# Patient Record
Sex: FEMALE | Race: WHITE
[De-identification: names, ages, dates, MRNs, and addresses within clinical notes are randomized per-mention and may not be internally consistent; named-entity substitution may affect disease eponyms.]

---

## 2017-11-06 ENCOUNTER — HOSPITAL ENCOUNTER (INPATIENT)
Dept: HOSPITAL 17 - PHED | Age: 62
LOS: 4 days | Discharge: HOME | DRG: 871 | End: 2017-11-10
Attending: HOSPITALIST | Admitting: HOSPITALIST
Payer: COMMERCIAL

## 2017-11-06 VITALS — HEIGHT: 57 IN | WEIGHT: 138.01 LBS | BODY MASS INDEX: 29.77 KG/M2

## 2017-11-06 VITALS
TEMPERATURE: 99.6 F | OXYGEN SATURATION: 98 % | RESPIRATION RATE: 18 BRPM | HEART RATE: 83 BPM | DIASTOLIC BLOOD PRESSURE: 54 MMHG | SYSTOLIC BLOOD PRESSURE: 96 MMHG

## 2017-11-06 VITALS — HEART RATE: 82 BPM

## 2017-11-06 VITALS
DIASTOLIC BLOOD PRESSURE: 64 MMHG | OXYGEN SATURATION: 97 % | RESPIRATION RATE: 18 BRPM | HEART RATE: 89 BPM | SYSTOLIC BLOOD PRESSURE: 125 MMHG | TEMPERATURE: 101.1 F

## 2017-11-06 VITALS
HEART RATE: 113 BPM | OXYGEN SATURATION: 95 % | TEMPERATURE: 102.1 F | DIASTOLIC BLOOD PRESSURE: 78 MMHG | SYSTOLIC BLOOD PRESSURE: 150 MMHG | RESPIRATION RATE: 24 BRPM

## 2017-11-06 VITALS
OXYGEN SATURATION: 94 % | RESPIRATION RATE: 20 BRPM | HEART RATE: 97 BPM | DIASTOLIC BLOOD PRESSURE: 52 MMHG | SYSTOLIC BLOOD PRESSURE: 99 MMHG | TEMPERATURE: 99 F

## 2017-11-06 VITALS
SYSTOLIC BLOOD PRESSURE: 99 MMHG | HEART RATE: 81 BPM | OXYGEN SATURATION: 95 % | TEMPERATURE: 98.5 F | DIASTOLIC BLOOD PRESSURE: 66 MMHG | RESPIRATION RATE: 18 BRPM

## 2017-11-06 VITALS — RESPIRATION RATE: 20 BRPM | OXYGEN SATURATION: 97 %

## 2017-11-06 VITALS — OXYGEN SATURATION: 94 %

## 2017-11-06 VITALS
HEART RATE: 92 BPM | RESPIRATION RATE: 16 BRPM | SYSTOLIC BLOOD PRESSURE: 98 MMHG | DIASTOLIC BLOOD PRESSURE: 53 MMHG | OXYGEN SATURATION: 96 %

## 2017-11-06 VITALS
SYSTOLIC BLOOD PRESSURE: 101 MMHG | HEART RATE: 82 BPM | TEMPERATURE: 97.9 F | DIASTOLIC BLOOD PRESSURE: 56 MMHG | RESPIRATION RATE: 16 BRPM | OXYGEN SATURATION: 93 %

## 2017-11-06 VITALS — HEART RATE: 81 BPM

## 2017-11-06 VITALS
SYSTOLIC BLOOD PRESSURE: 111 MMHG | DIASTOLIC BLOOD PRESSURE: 69 MMHG | RESPIRATION RATE: 20 BRPM | OXYGEN SATURATION: 97 % | HEART RATE: 113 BPM

## 2017-11-06 VITALS
SYSTOLIC BLOOD PRESSURE: 101 MMHG | OXYGEN SATURATION: 97 % | RESPIRATION RATE: 20 BRPM | HEART RATE: 94 BPM | DIASTOLIC BLOOD PRESSURE: 52 MMHG

## 2017-11-06 VITALS
RESPIRATION RATE: 18 BRPM | HEART RATE: 102 BPM | DIASTOLIC BLOOD PRESSURE: 68 MMHG | TEMPERATURE: 99.6 F | OXYGEN SATURATION: 96 % | SYSTOLIC BLOOD PRESSURE: 95 MMHG

## 2017-11-06 VITALS
DIASTOLIC BLOOD PRESSURE: 60 MMHG | RESPIRATION RATE: 18 BRPM | HEART RATE: 82 BPM | TEMPERATURE: 96.5 F | OXYGEN SATURATION: 95 % | SYSTOLIC BLOOD PRESSURE: 112 MMHG

## 2017-11-06 VITALS
DIASTOLIC BLOOD PRESSURE: 72 MMHG | RESPIRATION RATE: 18 BRPM | TEMPERATURE: 100 F | HEART RATE: 85 BPM | OXYGEN SATURATION: 94 % | SYSTOLIC BLOOD PRESSURE: 116 MMHG

## 2017-11-06 VITALS
TEMPERATURE: 99.5 F | RESPIRATION RATE: 18 BRPM | SYSTOLIC BLOOD PRESSURE: 99 MMHG | HEART RATE: 91 BPM | OXYGEN SATURATION: 95 % | DIASTOLIC BLOOD PRESSURE: 68 MMHG

## 2017-11-06 VITALS — TEMPERATURE: 100.2 F

## 2017-11-06 DIAGNOSIS — E78.5: ICD-10-CM

## 2017-11-06 DIAGNOSIS — F32.9: ICD-10-CM

## 2017-11-06 DIAGNOSIS — K64.8: ICD-10-CM

## 2017-11-06 DIAGNOSIS — E87.6: ICD-10-CM

## 2017-11-06 DIAGNOSIS — K63.5: ICD-10-CM

## 2017-11-06 DIAGNOSIS — E46: ICD-10-CM

## 2017-11-06 DIAGNOSIS — K63.89: ICD-10-CM

## 2017-11-06 DIAGNOSIS — K59.09: ICD-10-CM

## 2017-11-06 DIAGNOSIS — A41.9: Primary | ICD-10-CM

## 2017-11-06 DIAGNOSIS — K22.2: ICD-10-CM

## 2017-11-06 DIAGNOSIS — K44.9: ICD-10-CM

## 2017-11-06 DIAGNOSIS — Z96.651: ICD-10-CM

## 2017-11-06 DIAGNOSIS — J18.9: ICD-10-CM

## 2017-11-06 DIAGNOSIS — D50.9: ICD-10-CM

## 2017-11-06 DIAGNOSIS — Z87.891: ICD-10-CM

## 2017-11-06 DIAGNOSIS — K25.7: ICD-10-CM

## 2017-11-06 LAB
ALP SERPL-CCNC: 82 U/L (ref 45–117)
ALT SERPL-CCNC: 24 U/L (ref 10–53)
ANION GAP SERPL CALC-SCNC: 10 MEQ/L (ref 5–15)
APTT BLD: 30.9 SEC (ref 24.3–30.1)
AST SERPL-CCNC: 20 U/L (ref 15–37)
BASOPHILS # BLD AUTO: 0.1 TH/MM3 (ref 0–0.2)
BASOPHILS NFR BLD: 0.6 % (ref 0–2)
BILIRUB SERPL-MCNC: 0.3 MG/DL (ref 0.2–1)
BUN SERPL-MCNC: 6 MG/DL (ref 7–18)
CHLORIDE SERPL-SCNC: 102 MEQ/L (ref 98–107)
EOSINOPHIL # BLD: 0 TH/MM3 (ref 0–0.4)
EOSINOPHIL NFR BLD: 0.1 % (ref 0–4)
ERYTHROCYTE [DISTWIDTH] IN BLOOD BY AUTOMATED COUNT: 19.1 % (ref 11.6–17.2)
GFR SERPLBLD BASED ON 1.73 SQ M-ARVRAT: 114 ML/MIN (ref 89–?)
HCO3 BLD-SCNC: 25 MEQ/L (ref 21–32)
HCT VFR BLD CALC: 23.6 % (ref 35–46)
HEMO FLAGS: (no result)
INR PPP: 1 RATIO
LACTIC ACID GHOST: (no result)
LYMPHOCYTES # BLD AUTO: 1.2 TH/MM3 (ref 1–4.8)
LYMPHOCYTES NFR BLD AUTO: 8.6 % (ref 9–44)
MCH RBC QN AUTO: 16.1 PG (ref 27–34)
MCHC RBC AUTO-ENTMCNC: 28.6 % (ref 32–36)
MCV RBC AUTO: 56.4 FL (ref 80–100)
MONOCYTES NFR BLD: 5.4 % (ref 0–8)
NEUTROPHILS # BLD AUTO: 11.8 TH/MM3 (ref 1.8–7.7)
NEUTROPHILS NFR BLD AUTO: 85.3 % (ref 16–70)
PLATELET # BLD: 262 TH/MM3 (ref 150–450)
POTASSIUM SERPL-SCNC: 3.2 MEQ/L (ref 3.5–5.1)
PROTHROMBIN TIME: 11.1 SEC (ref 9.8–11.6)
RBC # BLD AUTO: 4.19 MIL/MM3 (ref 4–5.3)
SCAN/DIFF: (no result)
SCHISTOCYTES BLD QL SMEAR: (no result)
SODIUM SERPL-SCNC: 137 MEQ/L (ref 136–145)
WBC # BLD AUTO: 13.8 TH/MM3 (ref 4–11)

## 2017-11-06 PROCEDURE — 85060 BLOOD SMEAR INTERPRETATION: CPT

## 2017-11-06 PROCEDURE — 74250 X-RAY XM SM INT 1CNTRST STD: CPT

## 2017-11-06 PROCEDURE — 82272 OCCULT BLD FECES 1-3 TESTS: CPT

## 2017-11-06 PROCEDURE — 94640 AIRWAY INHALATION TREATMENT: CPT

## 2017-11-06 PROCEDURE — 82607 VITAMIN B-12: CPT

## 2017-11-06 PROCEDURE — 80053 COMPREHEN METABOLIC PANEL: CPT

## 2017-11-06 PROCEDURE — 93005 ELECTROCARDIOGRAM TRACING: CPT

## 2017-11-06 PROCEDURE — 85044 MANUAL RETICULOCYTE COUNT: CPT

## 2017-11-06 PROCEDURE — 85018 HEMOGLOBIN: CPT

## 2017-11-06 PROCEDURE — 82746 ASSAY OF FOLIC ACID SERUM: CPT

## 2017-11-06 PROCEDURE — 88312 SPECIAL STAINS GROUP 1: CPT

## 2017-11-06 PROCEDURE — 83010 ASSAY OF HAPTOGLOBIN QUANT: CPT

## 2017-11-06 PROCEDURE — P9016 RBC LEUKOCYTES REDUCED: HCPCS

## 2017-11-06 PROCEDURE — 86900 BLOOD TYPING SEROLOGIC ABO: CPT

## 2017-11-06 PROCEDURE — 83615 LACTATE (LD) (LDH) ENZYME: CPT

## 2017-11-06 PROCEDURE — 86850 RBC ANTIBODY SCREEN: CPT

## 2017-11-06 PROCEDURE — 80048 BASIC METABOLIC PNL TOTAL CA: CPT

## 2017-11-06 PROCEDURE — 83605 ASSAY OF LACTIC ACID: CPT

## 2017-11-06 PROCEDURE — 86920 COMPATIBILITY TEST SPIN: CPT

## 2017-11-06 PROCEDURE — 94664 DEMO&/EVAL PT USE INHALER: CPT

## 2017-11-06 PROCEDURE — 87040 BLOOD CULTURE FOR BACTERIA: CPT

## 2017-11-06 PROCEDURE — 82728 ASSAY OF FERRITIN: CPT

## 2017-11-06 PROCEDURE — 83550 IRON BINDING TEST: CPT

## 2017-11-06 PROCEDURE — 85014 HEMATOCRIT: CPT

## 2017-11-06 PROCEDURE — 36430 TRANSFUSION BLD/BLD COMPNT: CPT

## 2017-11-06 PROCEDURE — 85730 THROMBOPLASTIN TIME PARTIAL: CPT

## 2017-11-06 PROCEDURE — 80069 RENAL FUNCTION PANEL: CPT

## 2017-11-06 PROCEDURE — 83540 ASSAY OF IRON: CPT

## 2017-11-06 PROCEDURE — 71010: CPT

## 2017-11-06 PROCEDURE — 80061 LIPID PANEL: CPT

## 2017-11-06 PROCEDURE — 86901 BLOOD TYPING SEROLOGIC RH(D): CPT

## 2017-11-06 PROCEDURE — 85025 COMPLETE CBC W/AUTO DIFF WBC: CPT

## 2017-11-06 PROCEDURE — 30233N1 TRANSFUSION OF NONAUTOLOGOUS RED BLOOD CELLS INTO PERIPHERAL VEIN, PERCUTANEOUS APPROACH: ICD-10-PCS | Performed by: FAMILY MEDICINE

## 2017-11-06 PROCEDURE — 84132 ASSAY OF SERUM POTASSIUM: CPT

## 2017-11-06 PROCEDURE — 84165 PROTEIN E-PHORESIS SERUM: CPT

## 2017-11-06 PROCEDURE — 85610 PROTHROMBIN TIME: CPT

## 2017-11-06 PROCEDURE — 83735 ASSAY OF MAGNESIUM: CPT

## 2017-11-06 PROCEDURE — 96361 HYDRATE IV INFUSION ADD-ON: CPT

## 2017-11-06 PROCEDURE — 96365 THER/PROPH/DIAG IV INF INIT: CPT

## 2017-11-06 PROCEDURE — 87804 INFLUENZA ASSAY W/OPTIC: CPT

## 2017-11-06 PROCEDURE — 88305 TISSUE EXAM BY PATHOLOGIST: CPT

## 2017-11-06 RX ADMIN — Medication SCH TAB: at 13:00

## 2017-11-06 RX ADMIN — Medication SCH ML: at 20:18

## 2017-11-06 RX ADMIN — Medication SCH TAB: at 17:13

## 2017-11-06 NOTE — PD
HPI


Chief Complaint:  Cold / Flu Symptoms


Time Seen by Provider:  09:50


Travel History


International Travel<30 days:  No


Contact w/Intl Traveler<30days:  No


Traveled to known affect area:  No





History of Present Illness


HPI


This is a 62 year old female who presents to the emergency department with wet 

cough, fever, congestion and shortness of breath for 6 days, moderate severity, 

constant, associated with a productive cough with green sputum production.  Pt 

has had decreased appetite and is eating and drinking less.  (-) n/v/d (-) 

abdominal pain (-) dysuria  Pt. did recently watch her sister's grandchildren 

who had colds.





PFSH


Past Medical History


*** Narrative Medical


depression


Medical History:  Denies Significant Hx


Depression:  Yes


Immunizations Current:  Yes





Past Surgical History


Joint Replacement:  Yes (RIGHT KNEE)





Social History


Alcohol Use:  No


Tobacco Use:  No (FORMER)


Substance Use:  No





Allergies-Medications


(Allergen,Severity, Reaction):  


Coded Allergies:  


     No Known Allergies (Unverified  Allergy, Unknown, 11/6/17)


Reported Meds & Prescriptions





Reported Meds & Active Scripts


Active


Reported


Venlafaxine ER 24 HR (Venlafaxine HCl) 150 Mg Cap 150 Mg PO DAILY








Review of Systems


Except as stated in HPI:  all other systems reviewed are Neg





Physical Exam


Narrative


GENERAL:Uncomfortable appearing


SKIN: Focused skin assessment warm and dry.


HEAD: Atraumatic. Normocephalic. 


EYES: Pupils equal and round.  No injection or drainage. 


ENT:  Moist mucous membranes


NECK: Trachea midline. 


CARDIOVASCULAR: Regular rate and rhythm.  No murmur appreciated.


RESPIRATORY: Wet cough with sputum production, Diffuse rhonchi with labored 

respirations


GASTROINTESTINAL: Abdomen soft, non-tender, nondistended. 


MUSCULOSKELETAL: No obvious deformities. 


NEUROLOGICAL: Awake and alert. No obvious cranial nerve deficits.   Moving all 

extremities


PSYCHIATRIC: Appropriate mood and affect; insight and judgment normal.





Data


Data


Last Documented VS





Vital Signs








  Date Time  Temp Pulse Resp B/P (MAP) Pulse Ox O2 Delivery O2 Flow Rate FiO2


 


11/6/17 11:25  94 20 101/52 (68) 97 Nasal Cannula 2.00 


 


11/6/17 10:55        21


 


11/6/17 09:05 102.1       








Orders





 Orders


Complete Blood Count With Diff (11/6/17 10:08)


Comprehensive Metabolic Panel (11/6/17 10:08)


Lactic Acid Sepsis Protocol (11/6/17 10:08)


Influenzae A/B Antigen (11/6/17 10:08)


Blood Culture (11/6/17 10:08)


Chest, Single Ap (11/6/17 10:08)


Blood Glucose (11/6/17 10:08)


Ecg Monitoring (11/6/17 10:08)


Iv Access Insert/Monitor (11/6/17 10:08)


Oximetry (11/6/17 10:08)


Oxygen Administration (11/6/17 10:08)


Sodium Chlor 0.9% 1000 Ml Inj (Ns 1000 M (11/6/17 10:08)


Ceftriaxone Inj (Rocephin Inj) (11/6/17 10:15)


Azithromycin (Zithromax) (11/6/17 10:15)


Acetaminophen (Tylenol) (11/6/17 10:15)


Albuterol-Ipratropium Neb (Duoneb Neb) (11/6/17 10:45)


Type And Screen (11/6/17 11:04)


Prothrombin Time / Inr (Pt) (11/6/17 11:04)


Act Partial Throm Time (Ptt) (11/6/17 11:04)


Hemoglobin (Hgb) (11/6/17 11:04)


Red Blood Cells (Rbc) (11/6/17 12:02)


Blood Product Administration (11/6/17 12:02)


Sodium Chlor 0.9% 250 Ml Inj (Ns 250 Ml (11/6/17 12:15)


Admit Order (Ed Use Only) (11/6/17 12:15)





Labs





Laboratory Tests








Test


  11/6/17


10:35 11/6/17


11:14


 


White Blood Count 13.8 TH/MM3  


 


Red Blood Count 4.19 MIL/MM3  


 


Hemoglobin 6.8 GM/DL  6.3 GM/DL 


 


Hematocrit 23.6 %  


 


Mean Corpuscular Volume 56.4 FL  


 


Mean Corpuscular Hemoglobin 16.1 PG  


 


Mean Corpuscular Hemoglobin


Concent 28.6 % 


  


 


 


Red Cell Distribution Width 19.1 %  


 


Platelet Count 262 TH/MM3  


 


Mean Platelet Volume 8.4 FL  


 


Neutrophils (%) (Auto) 85.3 %  


 


Lymphocytes (%) (Auto) 8.6 %  


 


Monocytes (%) (Auto) 5.4 %  


 


Eosinophils (%) (Auto) 0.1 %  


 


Basophils (%) (Auto) 0.6 %  


 


Neutrophils # (Auto) 11.8 TH/MM3  


 


Lymphocytes # (Auto) 1.2 TH/MM3  


 


Monocytes # (Auto) 0.7 TH/MM3  


 


Eosinophils # (Auto) 0.0 TH/MM3  


 


Basophils # (Auto) 0.1 TH/MM3  


 


CBC Comment AUTO DIFF  


 


Differential Comment


  AUTO DIFF


CONFIRMED 


 


 


Keratocytes OCC  


 


Blood Urea Nitrogen 6 MG/DL  


 


Creatinine 0.54 MG/DL  


 


Random Glucose 112 MG/DL  


 


Total Protein 7.9 GM/DL  


 


Albumin 2.7 GM/DL  


 


Calcium Level 8.5 MG/DL  


 


Alkaline Phosphatase 82 U/L  


 


Aspartate Amino Transf


(AST/SGOT) 20 U/L 


  


 


 


Alanine Aminotransferase


(ALT/SGPT) 24 U/L 


  


 


 


Total Bilirubin 0.3 MG/DL  


 


Sodium Level 137 MEQ/L  


 


Potassium Level 3.2 MEQ/L  


 


Chloride Level 102 MEQ/L  


 


Carbon Dioxide Level 25.0 MEQ/L  


 


Anion Gap 10 MEQ/L  


 


Estimat Glomerular Filtration


Rate 114 ML/MIN 


  


 


 


Lactic Acid Level 2.7 mmol/L  


 


Prothrombin Time  11.1 SEC 


 


Prothromb Time International


Ratio 


  1.0 RATIO 


 


 


Activated Partial


Thromboplast Time 


  30.9 SEC 


 











MDM


Medical Decision Making


Medical Screen Exam Complete:  Yes


Emergency Medical Condition:  Yes


Interpretation(s)


Microcytic anemia


Leukocytosis


85% neutrophils


Mild hypokalemia


Lactic acid is 2.7


Chest x-ray: Patchy airspace disease in both lungs


Differential Diagnosis


Pneumonia, bronchitis, COPD exacerbation, pleural effusion, urinary tract 

infection, anemia, GI bleeding


Narrative Course


This is a 62-year-old female who presents to the emergency department with 

sepsis in the setting respiratory symptoms.  She was febrile, tachycardic to 113

, and had a lactic acid of 2.7.  She was placed on a monitor and an IV was 

established.  She was given 1 L of IV hydration.  A second liter was deferred 

as the patient is chronically ill and was likely going to receive blood 

transfusion and I was concerned about volume overload.  Patient received 

empiric antibiotic therapy for pneumonia.  Hemoccult was negative.  I suspect 

her anemia is in the setting of chronic iron deficiency as she is significantly 

microcytic.  Patient was admitted for continued IV antibiotics and blood 

transfusion.





Physician Communication


Physician Communication


Discussed with Dr. Campoverde





Diagnosis





 Primary Impression:  


 Community acquired pneumonia


 Qualified Codes:  J18.9 - Pneumonia, unspecified organism


 Additional Impression:  


 Sepsis


 Qualified Codes:  A41.9 - Sepsis, unspecified organism





Admitting Information


Admitting Physician Requests:  Admit


Scripts


Docusate Sodium (Dok) 100 Mg Cap


100 MG PO BID for Constipation, #60 CAP


   Prov: Ramón Richardson MD         11/10/17 


Albuterol 18 GM Inh (Ventolin Hfa 18 GM Inh) 90 Mcg/Act Aer


2 PUFF INH Q4-6H Y for SHORTNESS OF BREATH, #1 INHALER 0 Refills


   Prov: Ramón Richardson MD         11/10/17 


Omeprazole (Omeprazole) 40 Mg Cap


40 MG PO DAILY for ULCER, #30 CAP 0 Refills


   Prov: Ramón Richardson MD         11/10/17 


Ferrous Sulfate (Ferosul) 325 Mg (65 Mg Iron) Tablet


325 MG PO BID for anemia for 30 Days, TAB


   Prov: Ramón Richardson MD         11/10/17 


Levofloxacin (Levaquin) 750 Mg Tablet


750 MG PO DAILY for Infection for 1 Day, #1 TAB


   Prov: Ramón Richardson MD         11/10/17











Lalitha Valera MD Nov 6, 2017 10:08

## 2017-11-06 NOTE — RADRPT
EXAM DATE/TIME:  11/06/2017 10:20 

 

[facility] COMPARISON:     

No previous studies available for comparison.

 

                     

INDICATIONS :     

Fever, difficulty breathing, congestion.

                     

 

MEDICAL HISTORY :     

None.          

 

SURGICAL HISTORY :     

None.   

 

ENCOUNTER:     

Initial                                        

 

ACUITY:     

1 week      

 

PAIN SCORE:     

0/10

 

LOCATION:     

Bilateral chest 

 

FINDINGS:     

Patchy airspace disease in both lungs with peribronchial thickening.  Heart size is appropriate.  The
re is no pneumothorax.

 

CONCLUSION:     

Patchy airspace disease both lungs consistent with an inflammatory process.  There is no consolidatio
n.

 

 

 

 Shahid West MD FACR on November 06, 2017 at 10:35           

Board Certified Radiologist.

 This report was verified electronically.

## 2017-11-07 VITALS
DIASTOLIC BLOOD PRESSURE: 68 MMHG | TEMPERATURE: 98.8 F | RESPIRATION RATE: 20 BRPM | OXYGEN SATURATION: 97 % | SYSTOLIC BLOOD PRESSURE: 103 MMHG | HEART RATE: 84 BPM

## 2017-11-07 VITALS
DIASTOLIC BLOOD PRESSURE: 71 MMHG | OXYGEN SATURATION: 100 % | TEMPERATURE: 98.8 F | RESPIRATION RATE: 20 BRPM | HEART RATE: 82 BPM | SYSTOLIC BLOOD PRESSURE: 105 MMHG

## 2017-11-07 VITALS
DIASTOLIC BLOOD PRESSURE: 55 MMHG | HEART RATE: 84 BPM | RESPIRATION RATE: 20 BRPM | SYSTOLIC BLOOD PRESSURE: 114 MMHG | OXYGEN SATURATION: 98 % | TEMPERATURE: 98 F

## 2017-11-07 VITALS
HEART RATE: 83 BPM | SYSTOLIC BLOOD PRESSURE: 106 MMHG | RESPIRATION RATE: 20 BRPM | OXYGEN SATURATION: 98 % | TEMPERATURE: 98.4 F | DIASTOLIC BLOOD PRESSURE: 62 MMHG

## 2017-11-07 VITALS
OXYGEN SATURATION: 99 % | HEART RATE: 85 BPM | DIASTOLIC BLOOD PRESSURE: 56 MMHG | SYSTOLIC BLOOD PRESSURE: 100 MMHG | RESPIRATION RATE: 18 BRPM | TEMPERATURE: 98.1 F

## 2017-11-07 VITALS — OXYGEN SATURATION: 100 %

## 2017-11-07 VITALS — OXYGEN SATURATION: 93 %

## 2017-11-07 VITALS — OXYGEN SATURATION: 98 %

## 2017-11-07 LAB
ALP SERPL-CCNC: 76 U/L (ref 45–117)
ALT SERPL-CCNC: 20 U/L (ref 10–53)
ANION GAP SERPL CALC-SCNC: 7 MEQ/L (ref 5–15)
AST SERPL-CCNC: 15 U/L (ref 15–37)
BASOPHILS # BLD AUTO: 0.1 TH/MM3 (ref 0–0.2)
BASOPHILS NFR BLD: 0.6 % (ref 0–2)
BILIRUB SERPL-MCNC: 0.5 MG/DL (ref 0.2–1)
BUN SERPL-MCNC: 7 MG/DL (ref 7–18)
CHLORIDE SERPL-SCNC: 107 MEQ/L (ref 98–107)
EOSINOPHIL # BLD: 0.1 TH/MM3 (ref 0–0.4)
EOSINOPHIL NFR BLD: 1.1 % (ref 0–4)
ERYTHROCYTE [DISTWIDTH] IN BLOOD BY AUTOMATED COUNT: 22.6 % (ref 11.6–17.2)
FERRITIN SERPL-MCNC: 63 NG/ML (ref 8–252)
GFR SERPLBLD BASED ON 1.73 SQ M-ARVRAT: 144 ML/MIN (ref 89–?)
HCO3 BLD-SCNC: 29.6 MEQ/L (ref 21–32)
HCT VFR BLD CALC: 25.3 % (ref 35–46)
HDLC SERPL-MCNC: 30.8 MG/DL (ref 40–60)
HEMO FLAGS: (no result)
LDLC SERPL-MCNC: 60 MG/DL (ref 0–99)
LYMPHOCYTES # BLD AUTO: 2.4 TH/MM3 (ref 1–4.8)
LYMPHOCYTES NFR BLD AUTO: 25.1 % (ref 9–44)
MCH RBC QN AUTO: 18.4 PG (ref 27–34)
MCHC RBC AUTO-ENTMCNC: 30 % (ref 32–36)
MCV RBC AUTO: 61.3 FL (ref 80–100)
MONOCYTES NFR BLD: 5.8 % (ref 0–8)
NEUTROPHILS # BLD AUTO: 6.4 TH/MM3 (ref 1.8–7.7)
NEUTROPHILS NFR BLD AUTO: 67.4 % (ref 16–70)
OVALOCYTES BLD QL SMEAR: (no result)
PLATELET # BLD: 223 TH/MM3 (ref 150–450)
POTASSIUM SERPL-SCNC: 3.4 MEQ/L (ref 3.5–5.1)
RBC # BLD AUTO: 4.12 MIL/MM3 (ref 4–5.3)
RETICS/RBC NFR: 1.1 % (ref 0.4–3)
REVIEW FLAG: (no result)
SCAN/DIFF: (no result)
SODIUM SERPL-SCNC: 144 MEQ/L (ref 136–145)
TARGETS BLD QL SMEAR: (no result)
TRANSFERRIN IRON PROFILE: 225 MG/DL (ref 200–360)
VIT B12 SERPL-MCNC: 823 PG/ML (ref 193–986)
WBC # BLD AUTO: 9.6 TH/MM3 (ref 4–11)

## 2017-11-07 RX ADMIN — Medication SCH TAB: at 08:27

## 2017-11-07 RX ADMIN — Medication SCH ML: at 08:27

## 2017-11-07 RX ADMIN — Medication SCH TAB: at 13:21

## 2017-11-07 RX ADMIN — Medication SCH ML: at 19:55

## 2017-11-07 RX ADMIN — AZITHROMYCIN SCH MLS/HR: 500 INJECTION, POWDER, LYOPHILIZED, FOR SOLUTION INTRAVENOUS at 09:33

## 2017-11-07 RX ADMIN — Medication SCH TAB: at 17:14

## 2017-11-07 RX ADMIN — ALBUTEROL SULFATE PRN MG: 2.5 SOLUTION RESPIRATORY (INHALATION) at 21:41

## 2017-11-07 RX ADMIN — VENLAFAXINE HYDROCHLORIDE SCH MG: 75 CAPSULE, EXTENDED RELEASE ORAL at 08:27

## 2017-11-07 RX ADMIN — SODIUM CHLORIDE SCH MLS/HR: 900 INJECTION INTRAVENOUS at 11:55

## 2017-11-07 NOTE — HHI.PR
Subjective


Remarks


Patient states she feels like "i have come back from the dead."  Much improved. 

No SOB. Low grade fever overnight. Cough improved. 


Patient states she has dysphagia today. She usually takes a liquid diet and 

jumps from fad diet to fad diet-lately lemon with water. 


Patient has never had colonoscopy. no hematochezia, melena. no weight loss or 

change in caliber of stools.





Objective


Vitals





Vital Signs








  Date Time  Temp Pulse Resp B/P (MAP) Pulse Ox O2 Delivery O2 Flow Rate FiO2


 


11/7/17 08:00 98.8 78 20 105/71 (82) 100   


 


11/7/17 00:00 98.1 85 18 100/56 (71) 99   


 


11/6/17 20:29     94   21


 


11/6/17 20:04  82      


 


11/6/17 20:00 97.9 82 16 101/56 (71) 93   


 


11/6/17 18:17 100.0 85 18 116/72 (87) 94   


 


11/6/17 17:47 100.2       


 


11/6/17 16:00 101.1 89 18 125/64 (84) 97   


 


11/6/17 15:58 96.5 82 18 112/60 95   


 


11/6/17 15:39 98.5 81 18 99/66 95   


 


11/6/17 15:24 99.5 91 18 99/68 95   


 


11/6/17 15:09 99.6 102 18 95/68 96   


 


11/6/17 14:51  81      


 


11/6/17 14:45 99.6 83 18 96/54 (68) 98   


 


11/6/17 14:20        


 


11/6/17 13:30  92 16 98/53 (68) 96 Nasal Cannula 2.00 


 


11/6/17 12:57 99.0 97 20 99/52 (68) 94 Nasal Cannula 2.00 


 


11/6/17 11:25  94 20 101/52 (68) 97 Nasal Cannula 2.00 


 


11/6/17 10:55     91   21


 


11/6/17 10:55     97 Nasal Cannula 2.00 


 


11/6/17 10:55   20  97 Nasal Cannula 2.00 














I/O      


 


 11/6/17 11/6/17 11/6/17 11/7/17 11/7/17 11/7/17





 07:00 15:00 23:00 07:00 15:00 23:00


 


Intake Total  1100 ml 1137 ml 280 ml  


 


Balance  1100 ml 1137 ml 280 ml  


 


      


 


Intake Oral    280 ml  


 


IV Total  1100 ml 87 ml   


 


Packed Cells   400 ml   


 


Blood Product IV Normal Saline Flush   650 ml   


 


# Voids   2 1  


 


# Bowel Movements   0 0  








Result Diagram:  


11/7/17 0530 11/7/17 0530





Objective Remarks


GENERAL: Well-nourished, well-developed CF patient.


SKIN: Warm and dry.


HEAD: Normocephalic.


EYES: No scleral icterus. No injection or drainage. 


NECK: Supple, trachea midline. No JVD or lymphadenopathy.


CARDIOVASCULAR: Regular rate and rhythm without murmurs, gallops, or rubs. 


RESPIRATORY: Breath sounds equal bilaterally. Crackles in LLL. No accessory 

muscle use.


GASTROINTESTINAL: Abdomen soft, non-tender, nondistended. 


EXTREMITIES: No cyanosis, or edema. 


NEUROLOGICAL: Awake, alert, and oriented x 3. Non-focal.





A/P


Problem List:  


(1) Community acquired pneumonia


ICD Code:  J18.9 - Pneumonia, unspecified organism


(2) Severe anemia


ICD Code:  D64.9 - Anemia, unspecified


(3) Symptomatic anemia


ICD Code:  D64.9 - Anemia, unspecified


(4) Sepsis


ICD Code:  A41.9 - Sepsis, unspecified organism


Assessment and Plan











-B/l community acquired PNA, cxr with b/l patchy infiltrates - clinically 

improved, continue rocephin and zithromax, O2 via NC. 





-Severe anemia - Fe low. Patient has poor diet (liquid fad diets). S/p 

transfusion 1 units pRBC, Hb 7.6 today. Patient has dysphagia. Pt has never had 

colonoscopy . Hemoccult pending. Pt has chronic constipation. Consult 

gastroenterology. 





-Depression, HLD - continue home meds. 





-DVT px - SCDs











Lissa Stoner MD Nov 7, 2017 10:15

## 2017-11-07 NOTE — MB
cc:

JOANNE DICKSON

****

 

 

DATE OF CONSULTATION

11/07/2017

 

DATE OF BIRTH

1955

 

REFERRING PHYSICIAN

Dr. Campoverde.

 

REASON FOR REFERRAL

Severe anemia, possible GI bleed.  The patient also has dyspepsia.

 

HISTORY OF THE PRESENT ILLNESS

Thank you for the consultation.  A 62-year-old lady who had a history of a few

days of fever, cough, tachycardia and found to have white count elevation and

severe anemia, found to have pneumonia.  The patient was getting worse and her

hemoglobin was 6.3.  The patient denied any significant history of GI bleed.

No bright red blood per rectum.  No black stool.  No nausea or vomiting but she

said that she has history of iron malabsorption in the past that she was told

by some physician in the past. She does not have any history of where she had

her care.  Next the patient seems to be poorly nourished. She said she usually

takes liquid diet. Questionable dysphagia even though the patient denied that.

 

 

She said she has been anemic in the past but never had a full workup.

 

She has history of depression currently laying in bed comfortably.  She feels

that she is doing better.  She had bilateral pneumonia on imaging. She started

to feel better after antibiotics. No other GI complaints.

 

PAST MEDICAL HISTORY

Significant for:

1. Hyperlipidemia.

2. Depression.

3. Right knee surgery.

 

MEDICATIONS

Reviewed in the chart.

 

ALLERGIES

No allergies.

 

FAMILY HISTORY

Significant for diabetes.

 

SOCIAL HISTORY

Negative for tobacco, drug or alcohol.

 

REVIEW OF SYSTEMS

All 12-point negative except HPI.

 

PHYSICAL EXAMINATION

GENERAL: Alert, oriented, in no acute distress.

HEENT:  Pupils are round and reactive to light.

NECK: Supple.

CHEST: Bilateral rhonchi.

CARDIOVASCULAR:  Regular rate and rhythm.  No murmur or gallop.  ABDOMEN: Soft,

nondistended, nontender. Positive bowel sounds.  EXTREMITIES: No edema,

clubbing or cyanosis.

NEUROLOGIC:  Neurologically intact.

PSYCHOLOGIC: Appropriate.

 

LABORATORY DATA

White blood cell 13.8, today is 9.6. Hemoglobin 7.6 today up from 6.3. Platelet

223.

 

INR 1.0.

 

Liver function tests are normal.

 

Iron was 19, saturation was 6%.

 

ASSESSMENT/PLAN

A 62-year-old lady with anemia, questionable dysphagia and no  sign of active

bleeding but the patient is severely iron deficient. I recommend to her work up

with doing upper endoscopy and a colonoscopy but the patient said that she does

not want to have any procedure inpatient and she would prefer to have those as

an outpatient.  I explained to her that this could be dangerous if there is any

malignancy or she is actively losing blood but the patient does not want to

proceed with any procedure at this time. I recommend supportive care. You can

have iron supplement and you can treat with PPI empirically just in case the

patient has peptic ulcer disease. I recommend followup with GI soon after

discharge to schedule upper endoscopy and colonoscopy if the patient is willing

to do that. If the patient changes her mind we will be happy to do the

procedure as an inpatient.

 

 

 

                              _________________________________

                              MD WHITNEY Reinoso/KK

D:  11/7/2017/6:28 PM

T:  11/7/2017/6:57 PM

Visit #:  [id number]

Job #:  [id number]

## 2017-11-08 VITALS
TEMPERATURE: 99 F | SYSTOLIC BLOOD PRESSURE: 125 MMHG | RESPIRATION RATE: 17 BRPM | DIASTOLIC BLOOD PRESSURE: 63 MMHG | HEART RATE: 72 BPM

## 2017-11-08 VITALS
OXYGEN SATURATION: 99 % | HEART RATE: 78 BPM | RESPIRATION RATE: 16 BRPM | DIASTOLIC BLOOD PRESSURE: 69 MMHG | SYSTOLIC BLOOD PRESSURE: 141 MMHG | TEMPERATURE: 99.6 F

## 2017-11-08 VITALS
RESPIRATION RATE: 16 BRPM | DIASTOLIC BLOOD PRESSURE: 60 MMHG | SYSTOLIC BLOOD PRESSURE: 137 MMHG | HEART RATE: 78 BPM | TEMPERATURE: 99.2 F

## 2017-11-08 VITALS
RESPIRATION RATE: 20 BRPM | DIASTOLIC BLOOD PRESSURE: 57 MMHG | OXYGEN SATURATION: 95 % | SYSTOLIC BLOOD PRESSURE: 98 MMHG | HEART RATE: 74 BPM | TEMPERATURE: 98.4 F

## 2017-11-08 VITALS
RESPIRATION RATE: 16 BRPM | OXYGEN SATURATION: 100 % | DIASTOLIC BLOOD PRESSURE: 60 MMHG | HEART RATE: 76 BPM | SYSTOLIC BLOOD PRESSURE: 137 MMHG | TEMPERATURE: 99 F

## 2017-11-08 VITALS
SYSTOLIC BLOOD PRESSURE: 112 MMHG | HEART RATE: 72 BPM | OXYGEN SATURATION: 93 % | TEMPERATURE: 99.2 F | DIASTOLIC BLOOD PRESSURE: 78 MMHG | RESPIRATION RATE: 16 BRPM

## 2017-11-08 VITALS
HEART RATE: 73 BPM | DIASTOLIC BLOOD PRESSURE: 58 MMHG | OXYGEN SATURATION: 100 % | TEMPERATURE: 99.2 F | RESPIRATION RATE: 16 BRPM | SYSTOLIC BLOOD PRESSURE: 125 MMHG

## 2017-11-08 VITALS
RESPIRATION RATE: 17 BRPM | OXYGEN SATURATION: 96 % | DIASTOLIC BLOOD PRESSURE: 70 MMHG | TEMPERATURE: 98.9 F | SYSTOLIC BLOOD PRESSURE: 146 MMHG | HEART RATE: 71 BPM

## 2017-11-08 VITALS
RESPIRATION RATE: 18 BRPM | HEART RATE: 89 BPM | SYSTOLIC BLOOD PRESSURE: 109 MMHG | DIASTOLIC BLOOD PRESSURE: 58 MMHG | TEMPERATURE: 99 F

## 2017-11-08 VITALS
TEMPERATURE: 98.3 F | RESPIRATION RATE: 20 BRPM | SYSTOLIC BLOOD PRESSURE: 96 MMHG | HEART RATE: 77 BPM | OXYGEN SATURATION: 99 % | DIASTOLIC BLOOD PRESSURE: 50 MMHG

## 2017-11-08 VITALS
SYSTOLIC BLOOD PRESSURE: 104 MMHG | TEMPERATURE: 98.7 F | OXYGEN SATURATION: 99 % | DIASTOLIC BLOOD PRESSURE: 68 MMHG | RESPIRATION RATE: 16 BRPM | HEART RATE: 76 BPM

## 2017-11-08 VITALS
DIASTOLIC BLOOD PRESSURE: 55 MMHG | HEART RATE: 69 BPM | RESPIRATION RATE: 20 BRPM | SYSTOLIC BLOOD PRESSURE: 109 MMHG | TEMPERATURE: 98.6 F | OXYGEN SATURATION: 99 %

## 2017-11-08 VITALS
TEMPERATURE: 99.4 F | HEART RATE: 78 BPM | SYSTOLIC BLOOD PRESSURE: 123 MMHG | RESPIRATION RATE: 16 BRPM | OXYGEN SATURATION: 97 % | DIASTOLIC BLOOD PRESSURE: 60 MMHG

## 2017-11-08 LAB
ANION GAP SERPL CALC-SCNC: 7 MEQ/L (ref 5–15)
BASOPHILS # BLD AUTO: 0 TH/MM3 (ref 0–0.2)
BASOPHILS NFR BLD: 0.2 % (ref 0–2)
BUN SERPL-MCNC: 5 MG/DL (ref 7–18)
CHLORIDE SERPL-SCNC: 106 MEQ/L (ref 98–107)
EOSINOPHIL # BLD: 0.2 TH/MM3 (ref 0–0.4)
EOSINOPHIL NFR BLD: 1.6 % (ref 0–4)
ERYTHROCYTE [DISTWIDTH] IN BLOOD BY AUTOMATED COUNT: 22.2 % (ref 11.6–17.2)
GFR SERPLBLD BASED ON 1.73 SQ M-ARVRAT: 194 ML/MIN (ref 89–?)
HCO3 BLD-SCNC: 27.5 MEQ/L (ref 21–32)
HCT VFR BLD CALC: 23.1 % (ref 35–46)
HCT VFR BLD CALC: 29.1 % (ref 35–46)
HEMO FLAGS: (no result)
LYMPHOCYTES # BLD AUTO: 2.5 TH/MM3 (ref 1–4.8)
LYMPHOCYTES NFR BLD AUTO: 22.8 % (ref 9–44)
MAGNESIUM SERPL-MCNC: 2 MG/DL (ref 1.5–2.5)
MCH RBC QN AUTO: 18 PG (ref 27–34)
MCHC RBC AUTO-ENTMCNC: 29.7 % (ref 32–36)
MCV RBC AUTO: 60.8 FL (ref 80–100)
MONOCYTES NFR BLD: 5.9 % (ref 0–8)
NEUTROPHILS # BLD AUTO: 7.7 TH/MM3 (ref 1.8–7.7)
NEUTROPHILS NFR BLD AUTO: 69.5 % (ref 16–70)
PLATELET # BLD: 245 TH/MM3 (ref 150–450)
POTASSIUM SERPL-SCNC: 3 MEQ/L (ref 3.5–5.1)
POTASSIUM SERPL-SCNC: 3.6 MEQ/L (ref 3.5–5.1)
RBC # BLD AUTO: 3.8 MIL/MM3 (ref 4–5.3)
REVIEW FLAG: (no result)
SCAN/DIFF: (no result)
SODIUM SERPL-SCNC: 140 MEQ/L (ref 136–145)
TARGETS BLD QL SMEAR: (no result)
WBC # BLD AUTO: 11 TH/MM3 (ref 4–11)

## 2017-11-08 RX ADMIN — Medication SCH TAB: at 08:51

## 2017-11-08 RX ADMIN — SODIUM CHLORIDE ONE MLS/HR: 900 INJECTION, SOLUTION INTRAVENOUS at 18:58

## 2017-11-08 RX ADMIN — AZITHROMYCIN SCH MLS/HR: 500 INJECTION, POWDER, LYOPHILIZED, FOR SOLUTION INTRAVENOUS at 11:06

## 2017-11-08 RX ADMIN — Medication SCH ML: at 21:05

## 2017-11-08 RX ADMIN — HYDROCODONE BITARTRATE AND ACETAMINOPHEN PRN TAB: 10; 325 TABLET ORAL at 21:03

## 2017-11-08 RX ADMIN — SODIUM CHLORIDE ONE MLS/HR: 900 INJECTION, SOLUTION INTRAVENOUS at 18:00

## 2017-11-08 RX ADMIN — Medication SCH TAB: at 18:57

## 2017-11-08 RX ADMIN — SODIUM CHLORIDE AND POTASSIUM CHLORIDE SCH MLS/HR: 9; 1.49 INJECTION, SOLUTION INTRAVENOUS at 18:58

## 2017-11-08 RX ADMIN — Medication SCH TAB: at 12:35

## 2017-11-08 RX ADMIN — Medication SCH ML: at 08:50

## 2017-11-08 RX ADMIN — ALBUTEROL SULFATE PRN MG: 2.5 SOLUTION RESPIRATORY (INHALATION) at 17:24

## 2017-11-08 RX ADMIN — VENLAFAXINE HYDROCHLORIDE SCH MG: 75 CAPSULE, EXTENDED RELEASE ORAL at 08:51

## 2017-11-08 RX ADMIN — SODIUM CHLORIDE SCH MLS/HR: 900 INJECTION INTRAVENOUS at 12:36

## 2017-11-08 RX ADMIN — SODIUM CHLORIDE AND POTASSIUM CHLORIDE SCH MLS/HR: 9; 1.49 INJECTION, SOLUTION INTRAVENOUS at 08:49

## 2017-11-08 NOTE — HHI.PR
Subjective


Remarks


Says that shortness of breath slightly improved from yesterday.  Feels much 

better than admission.  Denies any chest pain.  She does report black tarry 

bowel movements on off recently.  No bright red blood in bowel movement.  

denies abdominal pain.





Objective





Vital Signs








  Date Time  Temp Pulse Resp B/P (MAP) Pulse Ox O2 Delivery O2 Flow Rate FiO2


 


11/8/17 14:30 99.6 78 16 141/69 99   


 


11/8/17 13:52 98.9 71 17 146/70 96   


 


11/8/17 13:22 99.0 76 16 137/60 100   


 


11/8/17 12:52 99.0 89 18 109/58    


 


11/8/17 12:37 99.2 78 16 137/60    


 


11/8/17 12:22 99.0 72 17 125/63    


 


11/8/17 12:07 99.2 73 16 125/58 100   


 


11/8/17 11:52 98.7 76 16 104/68 99   


 


11/8/17 08:00 98.4 74 20 98/57 (71) 95   


 


11/8/17 04:00 98.6 69 20 109/55 (73) 99   


 


11/8/17 00:00 98.3 77 20 96/50 (65) 99   


 


11/7/17 21:41     98 Nasal Cannula 2.00 


 


11/7/17 20:47     93   21


 


11/7/17 20:00 98.0 74 20 114/55 (74) 98   


 


11/7/17 20:00  84      














I/O      


 


 11/7/17 11/7/17 11/7/17 11/8/17 11/8/17 11/8/17





 07:00 15:00 23:00 07:00 15:00 23:00


 


Intake Total 280 ml 1175 ml  720 ml 757 ml 


 


Balance 280 ml 1175 ml  720 ml 757 ml 


 


      


 


Intake Oral 280 ml 825 ml  720 ml  


 


IV Total  350 ml   350 ml 


 


Packed Cells     400 ml 


 


Blood Product IV Normal Saline Flush     7 ml 


 


# Voids 1 3  2 1 


 


# Bowel Movements 0  1 1 1 








Result Diagram:  


11/8/17 1535                                                                   

             11/8/17 1535





Objective Remarks


GENERAL: Patient sitting up in bed.  Appears comfortable.


SKIN: Warm and dry.


HEAD: Normocephalic.


EYES: No scleral icterus. No injection or drainage. 


NECK: Supple, trachea midline. No JVD.


CARDIOVASCULAR: Regular rate and rhythm without murmurs, gallops, or rubs. 


RESPIRATORY: Breath sounds equal bilaterally. No accessory muscle use.


GASTROINTESTINAL: Abdomen soft, non-tender, nondistended.  Bilateral rhonchi.  

No wheezes.


MUSCULOSKELETAL: No cyanosis, or edema. 


BACK: Nontender without obvious deformity. No CVA tenderness.








A/P


Assessment and Plan


//B/l community acquired PNA, cxr with b/l patchy infiltrates - clinically 

improved, continue rocephin and zithromax, O2 via NC. 


-11/8.  Respiratory status much improved.  Continue antibiotics to complete 

course.  Continue to monitor.





//Severe anemia - Fe low. Patient has poor diet (liquid fad diets). S/p 

transfusion 1 units pRBC, Hb 7.6 today. Patient has dysphagia. Pt has never had 

colonoscopy . Hemoccult pending. Pt has chronic constipation. Consult 

gastroenterology. 


= 11/8.  Haptoglobin high, LDH slightly high.  No other evidence of hemolysis.  

Hemoglobin 8.5, improved after transfusion.  IV iron 1.  Plan for EGD/

colonoscopy tomorrow.





//Depression, HLD - continue home meds. 





//Hypokalemia.  Potassium 3.0 this morning.  Replaced.  Repeat 3.6.





//DVT px - SCDs


Discharge Planning


EGD colonoscopy tomorrow.


If hemoglobin stable, can possibly go home tomorrow.











Ramón Richardson MD Nov 8, 2017 17:04

## 2017-11-08 NOTE — HHI.GIFU
Subjective


Remarks


Patient is laying in bed, seemed to be comfortable, she had drop in her 

hemoglobin today but she already ate this morning





Objective


Vitals I&O





Vital Signs








  Date Time  Temp Pulse Resp B/P (MAP) Pulse Ox O2 Delivery O2 Flow Rate FiO2


 


11/8/17 11:52 98.7 76 16 104/68 99   


 


11/8/17 08:00 98.4 74 20 98/57 (71) 95   


 


11/8/17 04:00 98.6 69 20 109/55 (73) 99   


 


11/8/17 00:00 98.3 77 20 96/50 (65) 99   


 


11/7/17 21:41     98 Nasal Cannula 2.00 


 


11/7/17 20:47     93   21


 


11/7/17 20:00 98.0 74 20 114/55 (74) 98   


 


11/7/17 20:00  84      


 


11/7/17 16:00 98.8 84 20 103/68 (80) 97   














I/O      


 


 11/7/17 11/7/17 11/7/17 11/8/17 11/8/17 11/8/17





 07:00 15:00 23:00 07:00 15:00 23:00


 


Intake Total 280 ml 1175 ml  720 ml 252 ml 


 


Balance 280 ml 1175 ml  720 ml 252 ml 


 


      


 


Intake Oral 280 ml 825 ml  720 ml  


 


IV Total  350 ml   250 ml 


 


Blood Product IV Normal Saline Flush     2 ml 


 


# Voids 1 3  2  


 


# Bowel Movements 0  1 1  








Laboratory





Laboratory Tests








Test


  11/8/17


05:30


 


White Blood Count 11.0 


 


Red Blood Count 3.80 


 


Hemoglobin 6.9 


 


Hematocrit 23.1 


 


Mean Corpuscular Volume 60.8 


 


Mean Corpuscular Hemoglobin 18.0 


 


Mean Corpuscular Hemoglobin


Concent 29.7 


 


 


Red Cell Distribution Width 22.2 


 


Platelet Count 245 


 


Mean Platelet Volume 8.1 


 


Neutrophils (%) (Auto) 69.5 


 


Lymphocytes (%) (Auto) 22.8 


 


Monocytes (%) (Auto) 5.9 


 


Eosinophils (%) (Auto) 1.6 


 


Basophils (%) (Auto) 0.2 


 


Neutrophils # (Auto) 7.7 


 


Lymphocytes # (Auto) 2.5 


 


Monocytes # (Auto) 0.6 


 


Eosinophils # (Auto) 0.2 


 


Basophils # (Auto) 0.0 


 


CBC Comment AUTO DIFF 


 


Differential Comment


  AUTO DIFF


CONFIRMED


 


Target Cells 1+ 


 


Blood Urea Nitrogen 5 


 


Creatinine 0.34 


 


Random Glucose 89 


 


Calcium Level 8.0 


 


Sodium Level 140 


 


Potassium Level 3.0 


 


Chloride Level 106 


 


Carbon Dioxide Level 27.5 


 


Anion Gap 7 


 


Estimat Glomerular Filtration


Rate 194 


 














 Date/Time


Source Procedure


Growth Status


 


 


 11/6/17 10:45


Blood Peripheral Aerobic Blood Culture - Preliminary


NO GROWTH IN 2 DAYS Resulted


 


 11/6/17 10:45


Blood Peripheral Anaerobic Blood Culture - Preliminary


NO GROWTH IN 2 DAYS Resulted


 


 11/8/17 10:35


Stool Stool Stool Occult Blood (JANN)


Pending Received


 


 11/6/17 10:50


Nasal Washing Influenza Types A,B Antigen (JANN) - Final


NEGATIVE FOR FLU A AND B ANTIGEN.... Complete








Physical Exam


HEENT: Pupils round and reactive to light; normocephalic; atraumatic; no 

jaundice.  Throat is clear.


NECK: Neck is supple, no JVD, no lymphadenopathy.


CHEST:  Chest is clear to auscultation and percussion except some bilateral 

crackles


CARDIAC:  Regular rate and rhythm with no murmur gallop or rubs.


ABDOMEN:  Soft, nondistended, nontender; no hepatosplenomegaly; bowel sounds 

are present in all four quadrants.


EXTREMITIES: No clubbing, cyanosis, or edema.


SKIN:  Normal; no rash; no jaundice.


CNS:  No focal deficits; alert and oriented times three.





Assessment and Plan


Plan


Patient is a 62-year-old lady with multiple medical problems including pneumonia

, she has severe anemia continue to drop her hemoglobin, initially she refused 

any GI workup as an inpatient including upper endoscopy and colonoscopy





After discussing with the patient the fact that her hemoglobin continued to 

drop she is agreeable to have the colonoscopy and endoscopy now





Recommendations





Prep for colonoscopy with GoLYTELY


Clear liquid nothing by mouth after midnight


: EGD tomorrow


Monitor hemoglobin


Packed RBC as needed











Pretty Chu MD Nov 8, 2017 12:47

## 2017-11-09 VITALS
HEART RATE: 68 BPM | DIASTOLIC BLOOD PRESSURE: 56 MMHG | TEMPERATURE: 98.2 F | RESPIRATION RATE: 16 BRPM | OXYGEN SATURATION: 96 % | SYSTOLIC BLOOD PRESSURE: 114 MMHG

## 2017-11-09 VITALS
DIASTOLIC BLOOD PRESSURE: 56 MMHG | SYSTOLIC BLOOD PRESSURE: 141 MMHG | TEMPERATURE: 98.2 F | HEART RATE: 61 BPM | OXYGEN SATURATION: 61 % | RESPIRATION RATE: 16 BRPM

## 2017-11-09 VITALS
SYSTOLIC BLOOD PRESSURE: 117 MMHG | RESPIRATION RATE: 14 BRPM | DIASTOLIC BLOOD PRESSURE: 73 MMHG | TEMPERATURE: 97.8 F | OXYGEN SATURATION: 96 % | HEART RATE: 68 BPM

## 2017-11-09 VITALS
HEART RATE: 77 BPM | DIASTOLIC BLOOD PRESSURE: 65 MMHG | SYSTOLIC BLOOD PRESSURE: 100 MMHG | RESPIRATION RATE: 16 BRPM | TEMPERATURE: 97.4 F | OXYGEN SATURATION: 96 %

## 2017-11-09 VITALS
RESPIRATION RATE: 16 BRPM | OXYGEN SATURATION: 98 % | DIASTOLIC BLOOD PRESSURE: 63 MMHG | HEART RATE: 83 BPM | TEMPERATURE: 97.1 F | SYSTOLIC BLOOD PRESSURE: 92 MMHG

## 2017-11-09 VITALS
TEMPERATURE: 98.2 F | DIASTOLIC BLOOD PRESSURE: 56 MMHG | HEART RATE: 60 BPM | OXYGEN SATURATION: 96 % | RESPIRATION RATE: 15 BRPM | SYSTOLIC BLOOD PRESSURE: 114 MMHG

## 2017-11-09 VITALS — OXYGEN SATURATION: 95 %

## 2017-11-09 VITALS
HEART RATE: 84 BPM | RESPIRATION RATE: 16 BRPM | SYSTOLIC BLOOD PRESSURE: 106 MMHG | DIASTOLIC BLOOD PRESSURE: 52 MMHG | TEMPERATURE: 98.8 F | OXYGEN SATURATION: 97 %

## 2017-11-09 VITALS
TEMPERATURE: 98.6 F | OXYGEN SATURATION: 94 % | SYSTOLIC BLOOD PRESSURE: 101 MMHG | HEART RATE: 68 BPM | RESPIRATION RATE: 16 BRPM | DIASTOLIC BLOOD PRESSURE: 54 MMHG

## 2017-11-09 VITALS — OXYGEN SATURATION: 92 %

## 2017-11-09 LAB
ANION GAP SERPL CALC-SCNC: 5 MEQ/L (ref 5–15)
BASOPHILS # BLD AUTO: 0 TH/MM3 (ref 0–0.2)
BASOPHILS NFR BLD: 0.5 % (ref 0–2)
BUN SERPL-MCNC: 2 MG/DL (ref 7–18)
CHLORIDE SERPL-SCNC: 111 MEQ/L (ref 98–107)
EOSINOPHIL # BLD: 0.1 TH/MM3 (ref 0–0.4)
EOSINOPHIL NFR BLD: 1 % (ref 0–4)
ERYTHROCYTE [DISTWIDTH] IN BLOOD BY AUTOMATED COUNT: 27.1 % (ref 11.6–17.2)
GFR SERPLBLD BASED ON 1.73 SQ M-ARVRAT: 253 ML/MIN (ref 89–?)
HCO3 BLD-SCNC: 26.6 MEQ/L (ref 21–32)
HCT VFR BLD CALC: 26.2 % (ref 35–46)
HEMO FLAGS: (no result)
LYMPHOCYTES # BLD AUTO: 2 TH/MM3 (ref 1–4.8)
LYMPHOCYTES NFR BLD AUTO: 21.9 % (ref 9–44)
MAGNESIUM SERPL-MCNC: 2.1 MG/DL (ref 1.5–2.5)
MCH RBC QN AUTO: 19.9 PG (ref 27–34)
MCHC RBC AUTO-ENTMCNC: 30.9 % (ref 32–36)
MCV RBC AUTO: 64.5 FL (ref 80–100)
MONOCYTES NFR BLD: 5.4 % (ref 0–8)
NEUTROPHILS # BLD AUTO: 6.3 TH/MM3 (ref 1.8–7.7)
NEUTROPHILS NFR BLD AUTO: 71.2 % (ref 16–70)
PLAT MORPH BLD: NORMAL
PLATELET # BLD: 239 TH/MM3 (ref 150–450)
PLATELET BLD QL SMEAR: NORMAL
POLYCHROMASIA BLD QL SMEAR: 2 % (ref 0–1.9)
POTASSIUM SERPL-SCNC: 3.7 MEQ/L (ref 3.5–5.1)
RBC # BLD AUTO: 4.07 MIL/MM3 (ref 4–5.3)
SCAN/DIFF: (no result)
SODIUM SERPL-SCNC: 143 MEQ/L (ref 136–145)
SPHEROCYTES BLD QL SMEAR: (no result)
TARGETS BLD QL SMEAR: (no result)
TOTAL PROTEIN SPE: 7.1 GM/DL (ref 6–7.6)
WBC # BLD AUTO: 8.9 TH/MM3 (ref 4–11)

## 2017-11-09 PROCEDURE — 0DBE8ZX EXCISION OF LARGE INTESTINE, VIA NATURAL OR ARTIFICIAL OPENING ENDOSCOPIC, DIAGNOSTIC: ICD-10-PCS | Performed by: INTERNAL MEDICINE

## 2017-11-09 PROCEDURE — 0DD68ZX EXTRACTION OF STOMACH, VIA NATURAL OR ARTIFICIAL OPENING ENDOSCOPIC, DIAGNOSTIC: ICD-10-PCS | Performed by: INTERNAL MEDICINE

## 2017-11-09 PROCEDURE — 0D738ZZ DILATION OF LOWER ESOPHAGUS, VIA NATURAL OR ARTIFICIAL OPENING ENDOSCOPIC: ICD-10-PCS | Performed by: INTERNAL MEDICINE

## 2017-11-09 RX ADMIN — BISACODYL SCH MG: 5 TABLET, COATED ORAL at 17:05

## 2017-11-09 RX ADMIN — HYDROCODONE BITARTRATE AND ACETAMINOPHEN PRN TAB: 10; 325 TABLET ORAL at 14:28

## 2017-11-09 RX ADMIN — VENLAFAXINE HYDROCHLORIDE SCH MG: 75 CAPSULE, EXTENDED RELEASE ORAL at 09:39

## 2017-11-09 RX ADMIN — SODIUM CHLORIDE AND POTASSIUM CHLORIDE SCH MLS/HR: 9; 1.49 INJECTION, SOLUTION INTRAVENOUS at 03:32

## 2017-11-09 RX ADMIN — SODIUM CHLORIDE SCH MLS/HR: 900 INJECTION INTRAVENOUS at 12:18

## 2017-11-09 RX ADMIN — Medication SCH TAB: at 09:39

## 2017-11-09 RX ADMIN — Medication SCH TAB: at 17:06

## 2017-11-09 RX ADMIN — ALBUTEROL SULFATE PRN MG: 2.5 SOLUTION RESPIRATORY (INHALATION) at 20:39

## 2017-11-09 RX ADMIN — Medication SCH ML: at 09:39

## 2017-11-09 RX ADMIN — Medication SCH TAB: at 12:19

## 2017-11-09 NOTE — PD.PROCEDR
GI Procedure








PROCEDURE PERFORMED


colon with polypectomy, EGD with dilation and biopsy





INDICATION FOR PROCEDURE


Anemia





PROCEDURE:


The procedure, risks and benefits were discussed with Ms. Santana and informed


consent was obtained.  Anesthesia sedated her with Diprivan.  She was placed in 

the left lateral decubitus position.





EGD:


The Pentax videoscope was introduced through the oropharynx and advanced to the 

second portion of the duodenum under direct visualization. Retroflexion was 

performed in the stomach, there was a small linear ulcers in the body of the 

stomach biopsy was done, small hiatal hernia, also stricture in the distal 

esophagus status post dilation with savory guidewire dilator size 17 mm.





FINDINGS:


Hiatal hernia


Esophageal stricture dilated as above


Small ulcers in the body of the stomach superficial, does not explain the 

severe anemia that the patient has





Colonoscopy:


The Pentax videoscope was introduced through the rectum and advanced to []. 

Retroflexion was performed in the rectum. Colonic prep was good, patient has 

severe melanosis coli, small 5 mm polyp removed





FINDINGS:


Small colon polyp


Melanosis coli


Internal hemorrhoids





ESTIMATED BLOOD LOSS:


None





SPECIMENS REMOVED:


Body of the stomach


Small polyp from the colon





COMPLICATIONS:


None





IMPRESSION:


Small colon polyp


Melanosis coli


Hiatal hernia


Esophageal stricture


Small ulcerations in the body of the stomach


These findings doesn't explain the patient's severe anemia





PLAN:


Small bowel follow-through


Hematology consult


Await biopsy


May feet patient after small bowel follow-through


Monitor H&H











Pretty Chu MD Nov 9, 2017 08:25

## 2017-11-09 NOTE — RADRPT
EXAM DATE/TIME:  11/09/2017 10:09 

 

[facility] COMPARISON:     

No previous studies available for comparison.

 

                     

INDICATIONS :     

Anemia.

                     

 

FLUORO TIME:     

1.5 minutes

 

IMAGE COUNT:     

9

                     

 

CONTRAST:     

Entero Vu 24% Barium Sulfate (24% w/v, 20% w/w)

                     

 

IMAGING TIME(S):  

15 min, 30 min, 45 min

 

MEDICAL HISTORY :     

None.          

 

SURGICAL HISTORY :     

None.   

 

ENCOUNTER:     

Initial                                        

 

ACUITY:     

2 weeks      

 

PAIN SCORE:     

0/10

 

LOCATION:     

Bilateral  abdomen

 

FINDINGS:     

Preliminary film is unremarkable.

 

The stomach is grossly unremarkable.

 

Examination of the small bowel demonstrates normal mucosal pattern involving the jejunum and ileum.  
There is no evidence of mass or obstruction.  No intraluminal filling defects are identified.  Small 
bowel transit time is normal at 30 minutes.  Fluoroscopy of the abdomen and terminal ileum demonstrat
es no abnormality.

 

CONCLUSION:     Normal examination.  

 

 

 

 Raymond Hilton MD on November 09, 2017 at 11:49           

Board Certified Radiologist.

 This report was verified electronically.

## 2017-11-09 NOTE — EKG
Date Performed: 11/09/2017       Time Performed: 01:56:17

 

PTAGE:      62 years

 

EKG:      Sinus rhythm 

 

 NORMAL ECG 

 

NO PREVIOUS TRACING            

 

DOCTOR:   Curry Castrejon  Interpretating Date/Time  11/09/2017 13:59:10

## 2017-11-09 NOTE — HHI.PR
Subjective


Remarks


Patient says she is feeling all right today.  Reports breathing continues 

improved.  Denies any abdominal pain.  Feels like eating.  Small bowel series 

pending as per GI





Objective





Vital Signs








  Date Time  Temp Pulse Resp B/P (MAP) Pulse Ox O2 Delivery O2 Flow Rate FiO2


 


11/9/17 09:45 97.8 68 14 117/73 (88) 96   


 


11/9/17 09:05 98.2 72 16 101/61 (74) 98   


 


11/9/17 08:55 98.4 64 15 111/58 (75) 99   


 


11/9/17 08:35  69 15 90/56 (67) 98   


 


11/9/17 08:25 98.4 71 20 84/46 (59) 100   


 


11/9/17 07:34 98.2 60 15 114/56 (75) 96   


 


11/9/17 06:33 98.2 61 16 141/56 (84) 61   


 


11/9/17 04:00 98.2 68 16 114/56 (75) 96   


 


11/9/17 00:00 98.6 68 16 101/54 (70) 94   


 


11/8/17 20:41       2.00 


 


11/8/17 20:00 99.4 78 16 123/60 (81) 97   


 


11/8/17 16:00 99.2 72 16 112/78 (89) 93   


 


11/8/17 14:30 99.6 78 16 141/69 99   


 


11/8/17 13:52 98.9 71 17 146/70 96   


 


11/8/17 13:22 99.0 76 16 137/60 100   


 


11/8/17 12:52 99.0 89 18 109/58    


 


11/8/17 12:37 99.2 78 16 137/60    


 


11/8/17 12:22 99.0 72 17 125/63    


 


11/8/17 12:07 99.2 73 16 125/58 100   


 


11/8/17 11:52 98.7 76 16 104/68 99   














I/O      


 


 11/8/17 11/8/17 11/8/17 11/9/17 11/9/17 11/9/17





 07:00 15:00 23:00 07:00 15:00 23:00


 


Intake Total 720 ml 757 ml 420 ml 1600 ml 400 ml 


 


Output Total    800 ml  


 


Balance 720 ml 757 ml 420 ml 800 ml 400 ml 


 


      


 


Intake Oral 720 ml  420 ml 0 ml  


 


IV Total  350 ml  1600 ml  


 


Packed Cells  400 ml    


 


Blood Product IV Normal Saline Flush  7 ml    


 


Other     400 ml 


 


Output Urine Total    800 ml  


 


# Voids 2 1 3   


 


# Bowel Movements 1 1 2 6  








Result Diagram:  


11/9/17 0600 11/9/17 0600





Objective Remarks


GENERAL: Patient lying flat in bed.  Appears comfortable.


SKIN: Warm and dry.


HEAD: Normocephalic.


EYES: No scleral icterus. No injection or drainage. 


NECK: Supple, trachea midline. No JVD.


CARDIOVASCULAR: Regular rate and rhythm without murmurs, gallops, or rubs. 


RESPIRATORY: Breath sounds equal bilaterally. No accessory muscle use.


GASTROINTESTINAL: Abdomen soft, non-tender, nondistended.  Bilateral rhonchi 

improving.  No wheezes.


MUSCULOSKELETAL: No cyanosis, or edema. 


BACK: Nontender without obvious deformity. No CVA tenderness.








A/P


Assessment and Plan


//B/l community acquired PNA, cxr with b/l patchy infiltrates - clinically 

improved, continue rocephin and zithromax, O2 via NC. 


=  Respiratory status continues improving.  Continue antibiotics to complete 

course.  Continue to monitor.





//Severe anemia - Fe low. Patient has poor diet (liquid fad diets). S/p 

transfusion 1 units pRBC, Hb 7.6 today. Patient has dysphagia. Pt has never had 

colonoscopy . Hemoccult pending. Pt has chronic constipation. Consult 

gastroenterology. 


= 11/8.  Haptoglobin high, LDH slightly high.  No other evidence of hemolysis.  

Hemoglobin 8.5, improved after transfusion.  IV iron 1.  Plan for EGD/

colonoscopy tomorrow.


= 11/9.  Colonoscopy with colon polyp, EGD with esophageal stricture.,  No 

cause for anemia.  Status post IV iron infusion yesterday.  IV iron ordered 

again.  Hematology consulted.





//Esophageal stricture.  


//Hiatal hernia


//Small stomach body ulcerations


No explanation of patient's anemia.  Patient will need repeat colonoscopy in 3 

years.  Biopsies pending.  Follow-up with GI as outpatient.





//Depression, HLD - continue home meds. 





//Hypokalemia.  Potassium 3.0 this morning.  Replaced.  Repeat 3.6.





//DVT px - SCDs


Discharge Planning


Pending hematology consultation.











Ramón Richardson MD Nov 9, 2017 10:28

## 2017-11-09 NOTE — HHI.GIFU
Subjective


Remarks


Patient is laying in bed comfortably, no active bleeding, feels better, 

tolerated prep





Objective


Vitals I&O





Vital Signs








  Date Time  Temp Pulse Resp B/P (MAP) Pulse Ox O2 Delivery O2 Flow Rate FiO2


 


11/9/17 07:34 98.2 60 15 114/56 (75) 96   


 


11/9/17 06:33 98.2 61 16 141/56 (84) 61   


 


11/9/17 04:00 98.2 68 16 114/56 (75) 96   


 


11/9/17 00:00 98.6 68 16 101/54 (70) 94   


 


11/8/17 20:41       2.00 


 


11/8/17 20:00 99.4 78 16 123/60 (81) 97   


 


11/8/17 16:00 99.2 72 16 112/78 (89) 93   


 


11/8/17 14:30 99.6 78 16 141/69 99   


 


11/8/17 13:52 98.9 71 17 146/70 96   


 


11/8/17 13:22 99.0 76 16 137/60 100   


 


11/8/17 12:52 99.0 89 18 109/58    


 


11/8/17 12:37 99.2 78 16 137/60    


 


11/8/17 12:22 99.0 72 17 125/63    


 


11/8/17 12:07 99.2 73 16 125/58 100   


 


11/8/17 11:52 98.7 76 16 104/68 99   














I/O      


 


 11/8/17 11/8/17 11/8/17 11/9/17 11/9/17 11/9/17





 07:00 15:00 23:00 07:00 15:00 23:00


 


Intake Total 720 ml 757 ml 420 ml 1600 ml  


 


Output Total    800 ml  


 


Balance 720 ml 757 ml 420 ml 800 ml  


 


      


 


Intake Oral 720 ml  420 ml 0 ml  


 


IV Total  350 ml  1600 ml  


 


Packed Cells  400 ml    


 


Blood Product IV Normal Saline Flush  7 ml    


 


Output Urine Total    800 ml  


 


# Voids 2 1 3   


 


# Bowel Movements 1 1 2 6  








Laboratory





Laboratory Tests








Test


  11/8/17


15:35 11/9/17


06:00


 


Hemoglobin 8.5  8.1 


 


Hematocrit 29.1  26.2 


 


Potassium Level 3.6  3.7 


 


Magnesium Level 2.0  2.1 


 


White Blood Count  8.9 


 


Red Blood Count  4.07 


 


Mean Corpuscular Volume  64.5 


 


Mean Corpuscular Hemoglobin  19.9 


 


Mean Corpuscular Hemoglobin


Concent 


  30.9 


 


 


Red Cell Distribution Width  27.1 


 


Platelet Count  239 


 


Mean Platelet Volume  8.1 


 


Neutrophils (%) (Auto)  71.2 


 


Lymphocytes (%) (Auto)  21.9 


 


Monocytes (%) (Auto)  5.4 


 


Eosinophils (%) (Auto)  1.0 


 


Basophils (%) (Auto)  0.5 


 


Neutrophils # (Auto)  6.3 


 


Lymphocytes # (Auto)  2.0 


 


Monocytes # (Auto)  0.5 


 


Eosinophils # (Auto)  0.1 


 


Basophils # (Auto)  0.0 


 


CBC Comment  AUTO DIFF 


 


Blood Urea Nitrogen  2 


 


Creatinine  0.27 


 


Random Glucose  88 


 


Albumin  2.1 


 


Calcium Level  8.0 


 


Phosphorus Level  3.3 


 


Sodium Level  143 


 


Chloride Level  111 


 


Carbon Dioxide Level  26.6 


 


Anion Gap  5 


 


Estimat Glomerular Filtration


Rate 


  253 


 














 Date/Time


Source Procedure


Growth Status


 


 


 11/6/17 10:45


Blood Peripheral Aerobic Blood Culture - Preliminary


NO GROWTH IN 2 DAYS Resulted


 


 11/6/17 10:45


Blood Peripheral Anaerobic Blood Culture - Preliminary


NO GROWTH IN 2 DAYS Resulted


 


 11/8/17 10:35


Stool Stool Stool Occult Blood (JANN) - Final


HEMOCCULT NEGATIVE Complete


 


 11/6/17 10:50


Nasal Washing Influenza Types A,B Antigen (JANN) - Final


NEGATIVE FOR FLU A AND B ANTIGEN.... Complete








Physical Exam


HEENT: Pupils round and reactive to light; normocephalic; atraumatic; no 

jaundice.  Throat is clear.


NECK: Neck is supple, no JVD, no lymphadenopathy.


CHEST:  Chest is clear to auscultation and percussion except some bilateral 

crackles


CARDIAC:  Regular rate and rhythm with no murmur gallop or rubs.


ABDOMEN:  Soft, nondistended, nontender; no hepatosplenomegaly; bowel sounds 

are present in all four quadrants.


EXTREMITIES: No clubbing, cyanosis, or edema.


SKIN:  Normal; no rash; no jaundice.


CNS:  No focal deficits; alert and oriented times three.





Assessment and Plan


Plan


Patient is a 62-year-old lady with multiple medical problems including pneumonia

, she has severe anemia continue to drop her hemoglobin, initially she refused 

any GI workup as an inpatient including upper endoscopy and colonoscopy





After discussing with the patient the fact that her hemoglobin continued to 

drop she is agreeable to have the colonoscopy and endoscopy now





11/9/2017 patient is doing okay, hemoglobin 11 but lower but stable, no sign of 

GI bleed, upper endoscopy and a colonoscopy was performed





IMPRESSION:


Small colon polyp


Melanosis coli


Hiatal hernia


Esophageal stricture


Small ulcerations in the body of the stomach


These findings doesn't explain the patient's severe anemia











Pretty Chu MD Nov 9, 2017 08:27

## 2017-11-10 VITALS
SYSTOLIC BLOOD PRESSURE: 109 MMHG | DIASTOLIC BLOOD PRESSURE: 55 MMHG | HEART RATE: 90 BPM | OXYGEN SATURATION: 95 % | TEMPERATURE: 98.9 F | RESPIRATION RATE: 16 BRPM

## 2017-11-10 VITALS
RESPIRATION RATE: 18 BRPM | DIASTOLIC BLOOD PRESSURE: 52 MMHG | HEART RATE: 72 BPM | TEMPERATURE: 99.6 F | OXYGEN SATURATION: 93 % | SYSTOLIC BLOOD PRESSURE: 107 MMHG

## 2017-11-10 VITALS
HEART RATE: 87 BPM | DIASTOLIC BLOOD PRESSURE: 59 MMHG | RESPIRATION RATE: 18 BRPM | TEMPERATURE: 99.2 F | OXYGEN SATURATION: 95 % | SYSTOLIC BLOOD PRESSURE: 127 MMHG

## 2017-11-10 VITALS
DIASTOLIC BLOOD PRESSURE: 55 MMHG | SYSTOLIC BLOOD PRESSURE: 111 MMHG | TEMPERATURE: 98.9 F | RESPIRATION RATE: 16 BRPM | OXYGEN SATURATION: 93 % | HEART RATE: 90 BPM

## 2017-11-10 LAB
BASO STIPL BLD QL SMEAR: (no result)
BASOPHILS # BLD AUTO: 0 TH/MM3 (ref 0–0.2)
BASOPHILS NFR BLD: 0.3 % (ref 0–2)
DACRYOCYTES BLD QL SMEAR: (no result)
EOSINOPHIL # BLD: 0.2 TH/MM3 (ref 0–0.4)
EOSINOPHIL NFR BLD: 1.9 % (ref 0–4)
ERYTHROCYTE [DISTWIDTH] IN BLOOD BY AUTOMATED COUNT: 27.4 % (ref 11.6–17.2)
HCT VFR BLD CALC: 26.4 % (ref 35–46)
HEMO FLAGS: (no result)
LYMPHOCYTES # BLD AUTO: 2.1 TH/MM3 (ref 1–4.8)
LYMPHOCYTES NFR BLD AUTO: 21.9 % (ref 9–44)
MCH RBC QN AUTO: 19.5 PG (ref 27–34)
MCHC RBC AUTO-ENTMCNC: 30 % (ref 32–36)
MCV RBC AUTO: 64.8 FL (ref 80–100)
MONOCYTES NFR BLD: 4 % (ref 0–8)
NEUTROPHILS # BLD AUTO: 7 TH/MM3 (ref 1.8–7.7)
NEUTROPHILS NFR BLD AUTO: 71.9 % (ref 16–70)
PLAT MORPH BLD: NORMAL
PLATELET # BLD: 271 TH/MM3 (ref 150–450)
PLATELET BLD QL SMEAR: NORMAL
RBC # BLD AUTO: 4.07 MIL/MM3 (ref 4–5.3)
ROULEAUX BLD QL SMEAR: PRESENT
SCAN/DIFF: (no result)
SCHISTOCYTES BLD QL SMEAR: (no result)
SPHEROCYTES BLD QL SMEAR: (no result)
WBC # BLD AUTO: 9.7 TH/MM3 (ref 4–11)

## 2017-11-10 RX ADMIN — DOCUSATE SODIUM SCH MG: 100 CAPSULE, LIQUID FILLED ORAL at 01:04

## 2017-11-10 RX ADMIN — BISACODYL SCH MG: 5 TABLET, COATED ORAL at 01:04

## 2017-11-10 RX ADMIN — FERROUS SULFATE TAB 325 MG (65 MG ELEMENTAL FE) SCH MG: 325 (65 FE) TAB at 01:04

## 2017-11-10 RX ADMIN — Medication SCH TAB: at 12:13

## 2017-11-10 RX ADMIN — Medication SCH TAB: at 09:15

## 2017-11-10 RX ADMIN — FERROUS SULFATE TAB 325 MG (65 MG ELEMENTAL FE) SCH MG: 325 (65 FE) TAB at 09:15

## 2017-11-10 RX ADMIN — VENLAFAXINE HYDROCHLORIDE SCH MG: 75 CAPSULE, EXTENDED RELEASE ORAL at 09:16

## 2017-11-10 RX ADMIN — DOCUSATE SODIUM SCH MG: 100 CAPSULE, LIQUID FILLED ORAL at 09:15

## 2017-11-10 RX ADMIN — Medication SCH ML: at 01:04

## 2017-11-10 RX ADMIN — Medication SCH ML: at 09:16

## 2017-11-10 NOTE — MB
cc:

SHAYY WESTON MD

****

 

 

DATE OF CONSULTATION

11/09/2017

 

CHIEF COMPLAINT

1. Anemia.

2. Iron deficiency.

3. GI bleeding

 

HISTORY OF PRESENT ILLNESS

Ms. Santana is a 62-year-old lady with a history of depression and multiple

orthopedic surgeries who presented to [facility] on November 6, 2017, with

fever and cough at home.

 

Laboratory findings revealed a hemoglobin of 6.8, a white blood cell count of

13.8 with an elevated absolute neutrophil count at 11.8.  MCV was 56.4, RDW was

19.1, platelet count was 262,000.  Chemistry studies with a normal creatinine,

bilirubin of 0.5, normal AST, ALT, alkaline phosphatase, lactate dehydrogenase

259, total protein 7.3 with an albumin low at 2.3, haptoglobin of 394 and

reticulocyte count is 1.1.  Vitamin B12 is 823.  Stool for occult blood was

negative x2.  She tested negative for influenza.  Blood cultures with no growth

to date.  She was transfused 2 units of packed red blood cells during this

hospital stay.

 

She has been evaluated by the GI service and underwent an EGD and a colonoscopy

on November 9.  EGD showed small linear ulcers in the body of the stomach,

biopsy performed.  She had a small hiatal hernia and a stricture in the distal

esophagus that is status post dilation.  Colonoscopy performed which showed

good colon prep, melanosis coli, internal hemorrhoids and a small polyp which

was removed.  She had small bowel follow-through performed.

 

She has received 300 mg of IV iron sucrose and is currently on oral iron

supplementation.

 

PAST MEDICAL HISTORY

Depression.

 

PAST SURGICAL HISTORY

1. Right knee surgery.

2. Electrosurgery.

 

HOME MEDICATIONS

None.

 

ALLERGIES

No known drug allergies.

 

FAMILY HISTORY

No family history of anemia.

 

SOCIAL HISTORY

The patient recently moved to this area from [place].  She is moving to be

closer to her family including her sister Erma.  She denies tobacco,

alcohol and illegal drug use.

 

REVIEW OF SYSTEMS

GENERAL:  Positive for fatigue.

GI:  Constipation.  Poor diet.

RESPIRATORY:  Shortness of breath which is improving.  All other review of

systems are negative.

 

PHYSICAL EXAMINATION

GENERAL:  A well-developed, well-nourished lady in no distress, resting in bed

with family at bedside.

HEAD:  Normocephalic, atraumatic.

EYES:  No scleral icterus.  No conjunctival pallor.

OROPHARYNX:  Mucous membranes moist.

NECK:  Supple with no palpable lymphadenopathy.

CARDIOVASCULAR:  Regular rate and rhythm with no murmurs.  RESPIRATORY:  Clear

to auscultation bilaterally.

GI:  Soft, nontender, nondistended with bowel sounds present.  EXTREMITIES:  No

edema.

NEURO:  Grossly nonfocal.

PSYCH:  Appropriate mood and affect.

 

ASSESSMENT AND PLAN

1.  Anemia with microcytosis.  Uncertain of baseline hemoglobin or MCV.  No 
evidence of hemolysis.  Vitamin B12 WNL.  



She is status post transfusion of 2 units of packed red blood cells and has 
received a total to 300 mg of IV iron sucrose. 

Reticulocyte count is within normal limits. Hemoglobin stable after 
transfusion.  



Stool for occult blood is negative and thus far GI team as not found the

etiology of bleeding.  She has uterus intact; however, denies any vaginal

bleeding.  Denies blood in her urine.

 



Will check folate (history of poor diet, although this result will be 
influenced by blood transfusion), serum protein electrophoresis (given protein 
gap between total protein and albumin), peripheral blood smear.  Urine for 
occult blood.  



Agree with oral iron supplementation.  She has thus far received 800 mg of

elemental iron while inpatient.



She is markedly microcytic, greater than would be expected solely from iron 
deficiency/anemia of chronic inflammation.  Will replace iron and at that time 
would perform further evaluation for underlying thalassemia.  



Given normal platelet count, normal WBC an underlying bone marrow disorder is 
less likely.  She will need close follow up with hematology clinic.  









 

 

 

 

                              _________________________________

                              MD BRANDY Kruger/DAMION

D:  11/9/2017/6:22 PM

T:  11/10/2017/5:10 AM

Visit #:  [id number]

Job #:  [id number]

JOY

## 2017-11-11 NOTE — HHI.DS
__________________________________________________





Discharge Summary


Admission Date


Nov 6, 2017 at 12:15


Discharge Date:  Nov 10, 2017


Admitting Diagnosis





pneumonia





(1) Community acquired pneumonia


ICD Code:  J18.9 - Pneumonia, unspecified organism


(2) Severe anemia


ICD Code:  D64.9 - Anemia, unspecified


(3) Symptomatic anemia


ICD Code:  D64.9 - Anemia, unspecified


(4) Sepsis


ICD Code:  A41.9 - Sepsis, unspecified organism


Procedures


EGD, colonoscopy.  Please see report.


Brief History - From Admission


Mrs. Santana is a 62-year-old female.  She came in secondary to fevers and cough.

  Fever, tachycardia, leukocytosis, and pneumonia qualified her for sepsis.  No 

hypotension.  She says that she isn't feeling ill for several days and with the 

onset of fever with progressively worsening cough she decided to come to 

emergency department at the urging of her sister.  Anemia is also discovered.  

She has a hemoglobin level of 6.3 without evidence of GI bleed.  We discussed 

different possibilities.  When I mentioned poor iron absorption or low vitamin 

levels she admits that she has a poor diet and will often consume a liquid diet 

mostly.  Malnutrition could be an etiology for her anemia then, but further 

workup is needed.  She cannot recall any active bleed.  She has not had any 

recent surgeries.  She has a prior history of anemia without any need a 

transfusion that she can recall.  No chest pain reported.  She does have 

myalgias.  Imaging shows bilateral pneumonia.  At baseline she has 

hyperlipidemia and depression.  She does not take cholesterol medicine 

secondary to a history of recall of one of her medications to try to treat her 

cholesterol with and the lack of trust in cholesterol medication treatments.  

She is on Effexor for depression treatment.  No other complaints today.


CBC/BMP:  


11/10/17 0540                                                                  

              11/9/17 0600





Significant Findings





Laboratory Tests








Test


  11/8/17


05:30 11/8/17


15:35 11/9/17


06:00 11/9/17


20:20


 


Red Blood Count


  3.80 MIL/MM3


(4.00-5.30) 


  


  


 


 


Hemoglobin


  6.9 GM/DL


(11.6-15.3) 8.5 GM/DL


(11.6-15.3) 8.1 GM/DL


(11.6-15.3) 


 


 


Hematocrit


  23.1 %


(35.0-46.0) 29.1 %


(35.0-46.0) 26.2 %


(35.0-46.0) 


 


 


Mean Corpuscular Volume


  60.8 FL


(80.0-100.0) 


  64.5 FL


(80.0-100.0) 


 


 


Mean Corpuscular Hemoglobin


  18.0 PG


(27.0-34.0) 


  19.9 PG


(27.0-34.0) 


 


 


Mean Corpuscular Hemoglobin


Concent 29.7 %


(32.0-36.0) 


  30.9 %


(32.0-36.0) 


 


 


Red Cell Distribution Width


  22.2 %


(11.6-17.2) 


  27.1 %


(11.6-17.2) 


 


 


Target Cells 1+ (NORMAL)   1+ (NORMAL)  


 


Haptoglobin


  394 MG/DL


() 


  


  


 


 


Blood Urea Nitrogen 5 MG/DL (7-18)   2 MG/DL (7-18)  


 


Creatinine


  0.34 MG/DL


(0.50-1.00) 


  0.27 MG/DL


(0.50-1.00) 


 


 


Calcium Level


  8.0 MG/DL


(8.5-10.1) 


  8.0 MG/DL


(8.5-10.1) 


 


 


Potassium Level


  3.0 MEQ/L


(3.5-5.1) 


  


  


 


 


Lactate Dehydrogenase


  259 U/L


() 


  


  


 


 


Neutrophils (%) (Auto)


  


  


  71.2 %


(16.0-70.0) 


 


 


Polychromasia


  


  


  2.0 %


(0.0-1.9) 


 


 


Spherocytes   1+ (NORMAL)  


 


Albumin


  


  


  2.1 GM/DL


(3.4-5.0) 


 


 


Chloride Level


  


  


  111 MEQ/L


() 


 


 


Folate


  


  


  


  GREATER THAN


20.0 NG/ML


 


Test


  11/10/17


05:40 


  


  


 


 


Hemoglobin


  7.9 GM/DL


(11.6-15.3) 


  


  


 


 


Hematocrit


  26.4 %


(35.0-46.0) 


  


  


 


 


Mean Corpuscular Volume


  64.8 FL


(80.0-100.0) 


  


  


 


 


Mean Corpuscular Hemoglobin


  19.5 PG


(27.0-34.0) 


  


  


 


 


Mean Corpuscular Hemoglobin


Concent 30.0 %


(32.0-36.0) 


  


  


 


 


Red Cell Distribution Width


  27.4 %


(11.6-17.2) 


  


  


 


 


Neutrophils (%) (Auto)


  71.9 %


(16.0-70.0) 


  


  


 


 


Basophilic Stippling FAINT (NORMAL)    


 


Spherocytes OCC (NORMAL)    


 


Tear Drop Cells 1+ (NORMAL)    


 


Rouleau


  PRESENT


(NORMAL) 


  


  


 


 


Keratocytes OCC (NORMAL)    








Imaging





Last Impressions








Small Bowel X-Ray 11/9/17 0000 Signed





Impressions: 





 Service Date/Time:  Thursday, November 9, 2017 10:09 - CONCLUSION: Normal 





 examination.       Raymond Hilton MD 


 


Chest X-Ray 11/6/17 1008 Signed





Impressions: 





 Service Date/Time:  Monday, November 6, 2017 10:20 - CONCLUSION:  Patchy 





 airspace disease both lungs consistent with an inflammatory process.  There is 





 no consolidation.     Shahid West MD  FACR








PE at Discharge


GENERAL: Well-nourished, well-developed CF patient.


SKIN: Warm and dry.


HEAD: Normocephalic.


EYES: No scleral icterus. No injection or drainage. 


NECK: Supple, trachea midline. No JVD or lymphadenopathy.


CARDIOVASCULAR: Regular rate and rhythm without murmurs, gallops, or rubs. 


RESPIRATORY: Breath sounds equal bilaterally. Crackles in LLL. No accessory 

muscle use.


GASTROINTESTINAL: Abdomen soft, non-tender, nondistended. 


EXTREMITIES: No cyanosis, or edema. 


NEUROLOGICAL: Awake, alert, and oriented x 3. Non-focal.


Pt update on day of discharge


Date of service 11/10/17.  Patient seen the morning of 11/10/17.





Patient says she is feeling well.  Denies any chest pain or shortness of 

breath.  Would like to go home.  Family at bedside.  Patient says she will 

definitely see primary care on Monday or Tuesday.


Hospital Course


Chest x-ray showed patchy airspace disease bilateral lungs, and was treated 

with broad-spectrum antibiotics with gradual resolution of symptoms.  Patient 

was also found to have severe anemia with hemoglobin in the sixes, requiring 

transfusion.  Gastroenterology was consult to, found small stomach ulcers for 

which patient will need PPI and follow-up with gastroenterology.  She was given 

IV iron supplementation, and will be be discharged on by mouth iron 

supplementation.  Patient agrees to pursue close follow-up early next week with 

primary care.








//B/l community acquired PNA, cxr with b/l patchy infiltrates - clinically 

improved, continue rocephin and zithromax, O2 via NC. 


=  Respiratory status continues improving.  Continue antibiotics to complete 

course.  Continue to monitor.





//Severe anemia - Fe low. Patient has poor diet (liquid fad diets). S/p 

transfusion 1 units pRBC, Hb 7.6 today. Patient has dysphagia. Pt has never had 

colonoscopy . Hemoccult pending. Pt has chronic constipation. Consult 

gastroenterology. 


= 11/8.  Haptoglobin high, LDH slightly high.  No other evidence of hemolysis.  

Hemoglobin 8.5, improved after transfusion.  IV iron 1.  Plan for EGD/

colonoscopy tomorrow.


= 11/9.  Colonoscopy with colon polyp, EGD with esophageal stricture.,  No 

cause for anemia.  Status post IV iron infusion yesterday.  IV iron ordered 

again.  Hematology consulted.





//Esophageal stricture.  


//Hiatal hernia


//Small stomach body ulcerations


No explanation of patient's anemia.  Patient will need repeat colonoscopy in 3 

years.  Biopsies pending.  Follow-up with GI as outpatient.





//Depression, HLD - continue home meds. 





//Hypokalemia.  Potassium 3.0 this morning.  Replaced.  Repeat 3.6.





//DVT px - SCDs


Discharge Planning


Pending hematology consultation.


Pt Condition on Discharge:  Good


Discharge Disposition:  Discharge Home


Discharge Time:  > 30 minutes


Discharge Instructions


DIET: Follow Instructions for:  Heart Healthy Diet


Activities you can perform:  Regular-No Restrictions


Follow up Referrals:  


Appointment for Follow Up


Gastroenterology - 1 Week with Pebbles Camacho MD


Oncology/Hematology - 1 Week with Linnea Guido MD


PCP Follow-up - 2-3 Days





New Medications:  


Albuterol 18 GM Inh (Ventolin Hfa 18 GM Inh) 90 Mcg/Act Aer


2 PUFF INH Q4-6H PRN for SHORTNESS OF BREATH, #1 INHALER 0 Refills





Omeprazole (Omeprazole) 40 Mg Cap


40 MG PO DAILY for ULCER, #30 CAP 0 Refills





Docusate Sodium (Dok) 100 Mg Cap


100 MG PO BID for Constipation, #60 CAP





Ferrous Sulfate (Ferosul) 325 Mg (65 Mg Iron) Tablet


325 MG PO BID for anemia for 30 Days, TAB





Levofloxacin (Levaquin) 750 Mg Tablet


750 MG PO DAILY for Infection for 1 Day, #1 TAB





 


Continued Medications:  


Venlafaxine ER 24 HR (Venlafaxine ER 24 HR) 150 Mg Cap


150 MG PO DAILY, #30 CAP 0 Refills

















Ramón Richardson MD Nov 11, 2017 01:50

## 2017-11-13 LAB
ALBUMIN SPE: 3.03 GM/DL (ref 3.5–5)
ALPHA1 GLOB SERPL ELPH-MCNC: 0.38 GM/DL (ref 0.11–0.29)
ALPHA2 GLOB SERPL ELPH-MCNC: 1.19 GM/DL (ref 0.22–1)
BETA GLOBULINS  (SPE): 0.99 GM/DL (ref 0.53–1.03)

## 2018-06-13 ENCOUNTER — HOSPITAL ENCOUNTER (EMERGENCY)
Dept: HOSPITAL 17 - NEPD | Age: 63
Discharge: HOME | End: 2018-06-13
Payer: MEDICARE

## 2018-06-13 VITALS
TEMPERATURE: 98.3 F | OXYGEN SATURATION: 99 % | HEART RATE: 77 BPM | SYSTOLIC BLOOD PRESSURE: 132 MMHG | RESPIRATION RATE: 16 BRPM | DIASTOLIC BLOOD PRESSURE: 82 MMHG

## 2018-06-13 VITALS — BODY MASS INDEX: 28.54 KG/M2 | WEIGHT: 132.28 LBS | HEIGHT: 57 IN

## 2018-06-13 DIAGNOSIS — S00.412A: Primary | ICD-10-CM

## 2018-06-13 DIAGNOSIS — Z79.82: ICD-10-CM
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
## 2018-06-13 PROCEDURE — 99281 EMR DPT VST MAYX REQ PHY/QHP: CPT

## 2018-06-13 NOTE — PD
HPI


Chief Complaint:  ENT Complaint


Time Seen by Provider:  08:31


Travel History


International Travel<30 days:  No


Contact w/Intl Traveler<30days:  No


Traveled to known affect area:  No





History of Present Illness


HPI


Patient is a 63-year-old female presenting to the emergency department for 

evaluation of left ear bleeding.  Patient states she woke up this morning and 

rubbed her ear and noticed blood on her hand.  She states prior to that she 

felt her ear pop was concerned.  Patient does admit to using Q-tips daily.  

Patient denies any pain, fever, chills, headache.  Symptom onset was sudden, 

symptoms are mild in nature.  Bleeding resolved on its own.  Patient does take 

81 mg of aspirin daily.





PFSH


Past Medical History


Anemia:  Yes (IN PAST)


Arthritis:  Yes


Depression:  Yes


Cancer:  No


Cardiovascular Problems:  Yes


High Cholesterol:  Yes


Endocrine:  No


Genitourinary:  No


Musculoskeletal:  Yes


Neurologic:  No


Psychiatric:  Yes


Reproductive:  No


Respiratory:  Yes


Immunizations Current:  Yes


Tetanus Vaccination:  > 5 Years





Past Surgical History


Joint Replacement:  Yes (RIGHT KNEE)


Other Surgery:  Yes





Social History


Alcohol Use:  No


Tobacco Use:  No (FORMER)


Substance Use:  No





Allergies-Medications


(Allergen,Severity, Reaction):  


Coded Allergies:  


     No Known Allergies (Unverified  Allergy, Unknown, 6/13/18)


Reported Meds & Prescriptions





Reported Meds & Active Scripts


Active


Reported


Quetiapine (Quetiapine Fumarate) 25 Mg Tab 25 Mg PO DAILY


Lexapro (Escitalopram Oxalate) 10 Mg Tab 10 Mg PO DAILY








Review of Systems


Except as stated in HPI:  all other systems reviewed are Neg


HENT:  Positive: Ear Discharge





Physical Exam


Narrative


GENERAL: Well-developed, well-nourished, alert elderly female.  Presenting in 

no acute distress.


SKIN: Warm and dry.


HEAD: Normocephalic.


EYES: No scleral icterus. No injection or drainage. 


EARS: Bilateral pinnae and right external canal appear within normal limits. 

Bilateral tympanic membranes without  


erythema, dullness or perforation.  Left external ear canal with superficial 

abrasion at the 6 o'clock position.  No active bleeding noted.  Tympanic 

membrane is intact.  There are no signs of infection noted.


NECK: Supple, trachea midline. No JVD or lymphadenopathy.


CARDIOVASCULAR: Regular rate and rhythm without murmurs, gallops, or rubs. 


RESPIRATORY: Breath sounds equal bilaterally. No accessory muscle use.


GASTROINTESTINAL: Abdomen soft, non-tender, nondistended. 


MUSCULOSKELETAL: No cyanosis, or edema. 


BACK: Nontender without obvious deformity. No CVA tenderness.








Data


Data


Last Documented VS





Vital Signs








  Date Time  Temp Pulse Resp B/P (MAP) Pulse Ox O2 Delivery O2 Flow Rate FiO2


 


6/13/18 08:21 98.3 77 16 132/82 (99) 99   











MDM


Medical Decision Making


Medical Screen Exam Complete:  Yes


Emergency Medical Condition:  Yes


Interpretation(s)





Vital Signs








  Date Time  Temp Pulse Resp B/P (MAP) Pulse Ox O2 Delivery O2 Flow Rate FiO2


 


6/13/18 08:21 98.3 77 16 132/82 (99) 99   








Differential Diagnosis


Ruptured membrane versus otitis media versus otitis externa versus other


Narrative Course


Patient is a 63-year-old female presenting to emerge from for evaluation of 

bleeding from her left ear.  Exam is consistent with an abrasion.  Patient was 

advised to avoid putting anything in her ear.  She was advised at this time 

there are no signs or symptoms of infection.  She was reassured that abrasion 

will heal if left alone.  She was encouraged to follow-up with her primary 

doctor or return to emergency department any new worsening symptoms.  Patient 

verbalized understanding of these instructions.  Patient stable for discharge.





Diagnosis





 Primary Impression:  


 Abrasion of ear canal


 Qualified Codes:  S00.412A - Abrasion of left ear, initial encounter


Referrals:  


Primary Care Physician


Patient Instructions:  Abrasion (ED), General Instructions





***Additional Instructions:  


Do not put anything in her ear


Follow-up with your primary doctor


Return to emergency department for any new or worsening symptoms


***Med/Other Pt SpecificInfo:  No Change to Meds


Disposition:  01 DISCHARGE HOME


Condition:  Stable











Kandice Parker Jun 13, 2018 08:46